# Patient Record
Sex: FEMALE | Race: OTHER | URBAN - METROPOLITAN AREA
[De-identification: names, ages, dates, MRNs, and addresses within clinical notes are randomized per-mention and may not be internally consistent; named-entity substitution may affect disease eponyms.]

---

## 2021-09-17 ENCOUNTER — IMMUNIZATION (OUTPATIENT)
Dept: FAMILY MEDICINE CLINIC | Age: 36
End: 2021-09-17

## 2021-09-17 DIAGNOSIS — Z23 ENCOUNTER FOR IMMUNIZATION: Primary | ICD-10-CM

## 2021-09-17 PROCEDURE — 91300 COVID-19, MRNA, LNP-S, PF, 30MCG/0.3ML DOSE(PFIZER): CPT

## 2021-09-17 PROCEDURE — 0001A COVID-19, MRNA, LNP-S, PF, 30MCG/0.3ML DOSE(PFIZER): CPT

## 2024-10-16 ENCOUNTER — TELEPHONE (OUTPATIENT)
Age: 39
End: 2024-10-16

## 2024-10-16 NOTE — TELEPHONE ENCOUNTER
Used [employer] to communicate with patient. Patient presented in clinic to schedule a new patient appointment. She states she is not currently insured and wanted to know how much the appointment would cost and how she would have to pay. I let her know that she would considered a self-pay patient which requires a $40 down payment at the time of service and the remaining balance being billed to her after the visit. I let her know that I could not provide an exact amount of how much the visit would be, but we do offer financial assistance if needed. She states she is not currently working. Patient scheduled for a new patient appt on 11/18/2024.

## 2024-11-18 ENCOUNTER — OFFICE VISIT (OUTPATIENT)
Age: 39
End: 2024-11-18

## 2024-11-18 VITALS
BODY MASS INDEX: 37.19 KG/M2 | OXYGEN SATURATION: 95 % | SYSTOLIC BLOOD PRESSURE: 98 MMHG | RESPIRATION RATE: 18 BRPM | TEMPERATURE: 97.6 F | WEIGHT: 172.4 LBS | HEART RATE: 67 BPM | DIASTOLIC BLOOD PRESSURE: 64 MMHG | HEIGHT: 57 IN

## 2024-11-18 DIAGNOSIS — Z00.00 ANNUAL PHYSICAL EXAM: ICD-10-CM

## 2024-11-18 DIAGNOSIS — Z11.59 NEED FOR HEPATITIS C SCREENING TEST: ICD-10-CM

## 2024-11-18 DIAGNOSIS — Z11.4 SCREENING FOR HIV WITHOUT PRESENCE OF RISK FACTORS: ICD-10-CM

## 2024-11-18 DIAGNOSIS — Z11.3 SCREENING EXAMINATION FOR STI: ICD-10-CM

## 2024-11-18 DIAGNOSIS — Z12.4 SCREENING FOR CERVICAL CANCER: Primary | ICD-10-CM

## 2024-11-18 PROCEDURE — 99203 OFFICE O/P NEW LOW 30 MIN: CPT | Performed by: STUDENT IN AN ORGANIZED HEALTH CARE EDUCATION/TRAINING PROGRAM

## 2024-11-18 SDOH — ECONOMIC STABILITY: INCOME INSECURITY: HOW HARD IS IT FOR YOU TO PAY FOR THE VERY BASICS LIKE FOOD, HOUSING, MEDICAL CARE, AND HEATING?: HARD

## 2024-11-18 SDOH — ECONOMIC STABILITY: FOOD INSECURITY: WITHIN THE PAST 12 MONTHS, YOU WORRIED THAT YOUR FOOD WOULD RUN OUT BEFORE YOU GOT MONEY TO BUY MORE.: NEVER TRUE

## 2024-11-18 SDOH — ECONOMIC STABILITY: FOOD INSECURITY: WITHIN THE PAST 12 MONTHS, THE FOOD YOU BOUGHT JUST DIDN'T LAST AND YOU DIDN'T HAVE MONEY TO GET MORE.: NEVER TRUE

## 2024-11-18 ASSESSMENT — ENCOUNTER SYMPTOMS
NAUSEA: 0
EYE REDNESS: 0
WHEEZING: 0
CONSTIPATION: 0
SINUS PAIN: 0
COUGH: 0
VOMITING: 0
DIARRHEA: 0
SHORTNESS OF BREATH: 0
ABDOMINAL PAIN: 0

## 2024-11-18 ASSESSMENT — PATIENT HEALTH QUESTIONNAIRE - PHQ9
SUM OF ALL RESPONSES TO PHQ QUESTIONS 1-9: 0
SUM OF ALL RESPONSES TO PHQ9 QUESTIONS 1 & 2: 0
2. FEELING DOWN, DEPRESSED OR HOPELESS: NOT AT ALL
SUM OF ALL RESPONSES TO PHQ QUESTIONS 1-9: 0
SUM OF ALL RESPONSES TO PHQ QUESTIONS 1-9: 0
1. LITTLE INTEREST OR PLEASURE IN DOING THINGS: NOT AT ALL
SUM OF ALL RESPONSES TO PHQ QUESTIONS 1-9: 0

## 2024-11-18 NOTE — PROGRESS NOTES
I have reviewed all needed documentation in preparation for visit. Verified patient by name and date of birth  Chief Complaint   Patient presents with    New Patient    Establish Care    Abdominal Pain     1 month     Back Pain     X 4months       Vitals:    11/18/24 1135   BP: 98/64   Site: Left Upper Arm   Position: Sitting   Cuff Size: Medium Adult   Pulse: 67   Resp: 18   Temp: 97.6 °F (36.4 °C)   TempSrc: Temporal   SpO2: 95%   Weight: 78.2 kg (172 lb 6.4 oz)   Height: 1.444 m (4' 8.85\")       Health Maintenance Due   Topic Date Due    Depression Screen  Never done    Varicella vaccine (1 of 2 - 13+ 2-dose series) Never done    HIV screen  Never done    Hepatitis C screen  Never done    Hepatitis B vaccine (1 of 3 - 19+ 3-dose series) Never done    DTaP/Tdap/Td vaccine (1 - Tdap) Never done    Cervical cancer screen  Never done    Flu vaccine (1) Never done    COVID-19 Vaccine (2 - 2023-24 season) 09/01/2024     \"Have you been to the ER, urgent care clinic since your last visit?  Hospitalized since your last visit?\"    NO    “Have you seen or consulted any other health care providers outside of [facility] since your last visit?”    NO     “Have you had a pap smear?”    NO    No cervical cancer screening on file             Click Here for Release of Records Request         VERÓNICA Irwin

## 2024-11-18 NOTE — PROGRESS NOTES
Elizabeth Cisneros is a 39 y.o. female who presents to establish care. Patient was previously followed by no one.     Elizabeth Cisneros main concerns today are as follows:  Chief Complaint   Patient presents with    New Patient    Establish Care    Abdominal Pain     1 month     Back Pain     X 4months     Patient presents to establish care. Communication is via [facility] interpretation services. Patient is [place] speaking originally from [facility].     Concerns:     Lower abdominal pain:Occurred once about one month ago. She had pain with sex and then generalized pain. She is worried because she has not had a pap smear wants to make sure everything is OK. She did not have any burning with urination or urinary frequency at that time. Pain self resolved without intervention has not returned.     Lumbar back pain: has lower back pain with prolonged standing. No trauma no falls.       Otherwise she has no other preventative health prior to this visit. She has a young son who lives with her. She is sexually active with one partner but would still like to be screened for STIs to be safe.       Review of Systems  Review of Systems   Constitutional:  Negative for chills and fever.   HENT:  Negative for sinus pain.    Eyes:  Negative for redness.   Respiratory:  Negative for cough, shortness of breath and wheezing.    Cardiovascular:  Negative for chest pain.   Gastrointestinal:  Negative for abdominal pain, constipation, diarrhea, nausea and vomiting.   Endocrine: Negative for polyuria.   Genitourinary:  Negative for dysuria.   Musculoskeletal:  Negative for arthralgias and myalgias.   Neurological:  Negative for light-headedness and headaches.   Psychiatric/Behavioral:  Negative for agitation and dysphoric mood. The patient is not nervous/anxious.            OBJECTIVE  Vitals:    11/18/24 1135   BP: 98/64   Pulse: 67   Resp: 18   Temp: 97.6 °F (36.4 °C)   SpO2: 95%      Wt Readings from Last 3